# Patient Record
Sex: MALE | Race: WHITE | NOT HISPANIC OR LATINO | URBAN - METROPOLITAN AREA
[De-identification: names, ages, dates, MRNs, and addresses within clinical notes are randomized per-mention and may not be internally consistent; named-entity substitution may affect disease eponyms.]

---

## 2019-05-28 ENCOUNTER — EMERGENCY (EMERGENCY)
Facility: HOSPITAL | Age: 39
LOS: 1 days | Discharge: HOME | End: 2019-05-28
Attending: EMERGENCY MEDICINE
Payer: COMMERCIAL

## 2019-05-28 VITALS
OXYGEN SATURATION: 100 % | HEART RATE: 62 BPM | RESPIRATION RATE: 22 BRPM | SYSTOLIC BLOOD PRESSURE: 124 MMHG | WEIGHT: 160.06 LBS | DIASTOLIC BLOOD PRESSURE: 72 MMHG | HEIGHT: 74 IN | TEMPERATURE: 98 F

## 2019-05-28 PROCEDURE — 99283 EMERGENCY DEPT VISIT LOW MDM: CPT | Mod: 25

## 2019-05-28 PROCEDURE — 12011 RPR F/E/E/N/L/M 2.5 CM/<: CPT

## 2019-05-28 NOTE — ED ADULT TRIAGE NOTE - CHIEF COMPLAINT QUOTE
hit chin on bike rack, appx 2cm laceration under chin, negative LOC, tetanus UTD, bleeding controlled

## 2019-05-29 NOTE — ED PROVIDER NOTE - SKIN, MLM
Skin normal color for race, warm, dry and intact, except for 2cm laceration to chin with muscle exposure.

## 2019-05-29 NOTE — ED PROVIDER NOTE - CHPI ED SYMPTOMS NEG
no fever/no chills/chest pain, difficulty breathing, abdominal pain, n/v/d, headache, dizziness, back pain or neck pain

## 2019-05-29 NOTE — ED PROVIDER NOTE - PROGRESS NOTE DETAILS
PT differed plastics closure after discussion of the risks vs benefits of procedure being performed by specialist.

## 2019-05-29 NOTE — ED PROVIDER NOTE - CLINICAL SUMMARY MEDICAL DECISION MAKING FREE TEXT BOX
PT with stable VS, no acute distress, non toxic appearing, tolerating PO in the ED, taniya intact, UTD on vaccinations, pt wound closed, pt will be dc home with follow up to PCP, wound care, educated about head in jurys, educated about when to return to the ED if needed. PT verbalizes that he understands all instructions and results. Pt educated about the risks vs benefits of imaging at this time and agrees that not warranted for their symptoms, and PE.

## 2019-05-29 NOTE — ED PROVIDER NOTE - OBJECTIVE STATEMENT
38 y/o M with no PMHx presents to ED c/o laceration to chin s/p fall today at work. States he was walking when he tripped causing him to fall head first into a bike rack, hitting his chin. Notes the laceration began bleeding immediately after the injury occurred but washed out the wound prior to arrival. Denies fevers, chills, chest pain, difficulty breathing, abdominal pain, n/v/d, headache, dizziness, back pain or neck pain. Tetanus UTD. No further acute complaints at this time.

## 2021-05-20 NOTE — ED PROVIDER NOTE - HISTORY ATTESTATION, MLM
2. The status of comorbities. (See ED/admit documents) I have reviewed and confirmed nurses' notes...